# Patient Record
Sex: FEMALE | Race: WHITE | Employment: FULL TIME | URBAN - METROPOLITAN AREA
[De-identification: names, ages, dates, MRNs, and addresses within clinical notes are randomized per-mention and may not be internally consistent; named-entity substitution may affect disease eponyms.]

---

## 2019-01-06 ENCOUNTER — ED HISTORICAL/CONVERTED ENCOUNTER (OUTPATIENT)
Dept: OTHER | Age: 34
End: 2019-01-06

## 2019-01-09 ENCOUNTER — IP HISTORICAL/CONVERTED ENCOUNTER (OUTPATIENT)
Dept: OTHER | Age: 34
End: 2019-01-09

## 2019-08-07 ENCOUNTER — IP HISTORICAL/CONVERTED ENCOUNTER (OUTPATIENT)
Dept: OTHER | Age: 34
End: 2019-08-07

## 2020-02-04 ENCOUNTER — ED HISTORICAL/CONVERTED ENCOUNTER (OUTPATIENT)
Dept: OTHER | Age: 35
End: 2020-02-04

## 2020-03-16 ENCOUNTER — IP HISTORICAL/CONVERTED ENCOUNTER (OUTPATIENT)
Dept: OTHER | Age: 35
End: 2020-03-16

## 2020-11-21 ENCOUNTER — HOSPITAL ENCOUNTER (EMERGENCY)
Age: 35
Discharge: HOME | End: 2020-11-21
Payer: MEDICAID

## 2020-11-21 ENCOUNTER — APPOINTMENT (OUTPATIENT)
Dept: GENERAL RADIOLOGY | Age: 35
End: 2020-11-21
Attending: EMERGENCY MEDICINE
Payer: MEDICAID

## 2020-11-21 VITALS
SYSTOLIC BLOOD PRESSURE: 105 MMHG | DIASTOLIC BLOOD PRESSURE: 67 MMHG | HEART RATE: 98 BPM | WEIGHT: 120 LBS | TEMPERATURE: 99.5 F | HEIGHT: 64 IN | BODY MASS INDEX: 20.49 KG/M2 | RESPIRATION RATE: 16 BRPM | OXYGEN SATURATION: 98 %

## 2020-11-21 DIAGNOSIS — J06.9 UPPER RESPIRATORY TRACT INFECTION, UNSPECIFIED TYPE: Primary | ICD-10-CM

## 2020-11-21 DIAGNOSIS — N39.0 URINARY TRACT INFECTION WITH HEMATURIA, SITE UNSPECIFIED: ICD-10-CM

## 2020-11-21 DIAGNOSIS — R31.9 URINARY TRACT INFECTION WITH HEMATURIA, SITE UNSPECIFIED: ICD-10-CM

## 2020-11-21 LAB
APPEARANCE UR: ABNORMAL
BACTERIA URNS QL MICRO: NEGATIVE /HPF
BILIRUB UR QL: NEGATIVE
COLOR UR: ABNORMAL
FLUAV AG NPH QL IA: NEGATIVE
FLUBV AG NOSE QL IA: NEGATIVE
GLUCOSE UR STRIP.AUTO-MCNC: NEGATIVE MG/DL
HGB UR QL STRIP: NEGATIVE
KETONES UR QL STRIP.AUTO: NEGATIVE MG/DL
LEUKOCYTE ESTERASE UR QL STRIP.AUTO: ABNORMAL
MUCOUS THREADS URNS QL MICRO: ABNORMAL /LPF
NITRITE UR QL STRIP.AUTO: NEGATIVE
PH UR STRIP: >8.5 [PH] (ref 5–8)
PROT UR STRIP-MCNC: >300 MG/DL
RBC #/AREA URNS HPF: ABNORMAL /HPF (ref 0–5)
SARS-COV-2, COV2: NORMAL
SP GR UR REFRACTOMETRY: 1.03 (ref 1–1.03)
UROBILINOGEN UR QL STRIP.AUTO: 4 EU/DL (ref 0.1–1)
WBC URNS QL MICRO: ABNORMAL /HPF (ref 0–4)

## 2020-11-21 PROCEDURE — 99283 EMERGENCY DEPT VISIT LOW MDM: CPT

## 2020-11-21 PROCEDURE — 87804 INFLUENZA ASSAY W/OPTIC: CPT

## 2020-11-21 PROCEDURE — 81001 URINALYSIS AUTO W/SCOPE: CPT

## 2020-11-21 PROCEDURE — 74011250637 HC RX REV CODE- 250/637: Performed by: NURSE PRACTITIONER

## 2020-11-21 PROCEDURE — 87635 SARS-COV-2 COVID-19 AMP PRB: CPT

## 2020-11-21 PROCEDURE — 71045 X-RAY EXAM CHEST 1 VIEW: CPT

## 2020-11-21 RX ORDER — IBUPROFEN 600 MG/1
600 TABLET ORAL
Qty: 20 TAB | Refills: 0 | Status: SHIPPED | OUTPATIENT
Start: 2020-11-21 | End: 2020-12-11

## 2020-11-21 RX ORDER — IBUPROFEN 600 MG/1
600 TABLET ORAL
Status: COMPLETED | OUTPATIENT
Start: 2020-11-21 | End: 2020-11-21

## 2020-11-21 RX ORDER — ONDANSETRON 4 MG/1
4 TABLET, ORALLY DISINTEGRATING ORAL
Qty: 9 TAB | Refills: 0 | Status: SHIPPED | OUTPATIENT
Start: 2020-11-21 | End: 2020-11-24

## 2020-11-21 RX ORDER — CEPHALEXIN 500 MG/1
500 CAPSULE ORAL 4 TIMES DAILY
Qty: 28 CAP | Refills: 0 | Status: SHIPPED | OUTPATIENT
Start: 2020-11-21 | End: 2020-11-28

## 2020-11-21 RX ADMIN — IBUPROFEN 600 MG: 600 TABLET ORAL at 09:02

## 2020-11-21 NOTE — DISCHARGE SUMMARY
Upon reviewing dc instructions. Pt upset as she feels she needs IVF. Pt urine -ketones. Pt skin pink warm dry. Reviewed +urine on urinalysis. Attempted to reassure pt, pt states she will go to another hospital. Advised that was fine. RX sent for keflex and zofran for pt. Pt no active vomiting here in ED. States she does not have urine infection.

## 2020-11-21 NOTE — LETTER
[address] [place] [address] EMERGENCY DEPT 
[address] [place] [address] 
[phone] Work/School Note Date: 11/21/2020 To Whom It May concern: 
 
Travis Carcamo was seen and treated today in the emergency room by the following provider(s): 
Nurse Practitioner: Contreras Qiu NP. Travis Carcamo is excused from work/school on 11/21/20 and 11/22/20. She is medically clear to return to work/school on 11/23/2020.   
 
 
Sincerely, 
 
 
 
 
Lizeth Barton NP

## 2020-11-21 NOTE — ED PROVIDER NOTES
EMERGENCY DEPARTMENT HISTORY AND PHYSICAL EXAM      Date: 11/21/2020  Patient Name: Travis Carcamo    History of Presenting Illness     Chief Complaint   Patient presents with    Generalized Body Aches       History Provided By: Patient    HPI: Travis Carcamo, 30 y.o. female with a past medical history significant No significant past medical history presents to the ED with cc of fever, back pain, lower abd pressure, cough, vomiting and diarrhea. There are no other complaints, changes, or physical findings at this time. PCP: UNKNOWN    No current facility-administered medications on file prior to encounter. No current outpatient medications on file prior to encounter. Past History     Past Medical History:  History reviewed. No pertinent past medical history. Past Surgical History:  History reviewed. No pertinent surgical history. Family History:  History reviewed. No pertinent family history. Social History:  Social History     Tobacco Use    Smoking status: Current Every Day Smoker    Smokeless tobacco: Never Used   Substance Use Topics    Alcohol use: Not on file    Drug use: Not on file       Allergies:  No Known Allergies      Review of Systems     Review of Systems   Constitutional: Positive for fatigue and fever. HENT: Negative. Respiratory: Positive for cough. Negative for shortness of breath. Cardiovascular: Negative. Gastrointestinal: Positive for diarrhea and vomiting. Genitourinary: Positive for dysuria. Negative for decreased urine volume. Dark urine   Musculoskeletal: Negative. Neurological: Positive for headaches. Physical Exam     Physical Exam  Vitals signs and nursing note reviewed. Constitutional:       Appearance: Normal appearance. She is normal weight. HENT:      Head: Normocephalic and atraumatic. Eyes:      Extraocular Movements: Extraocular movements intact. Pupils: Pupils are equal, round, and reactive to light. Cardiovascular:      Rate and Rhythm: Normal rate and regular rhythm. Pulses: Normal pulses. Heart sounds: Normal heart sounds. Pulmonary:      Effort: Pulmonary effort is normal.      Breath sounds: Normal breath sounds. Abdominal:      General: Abdomen is flat. Musculoskeletal: Normal range of motion. Skin:     General: Skin is warm and dry. Neurological:      General: No focal deficit present. Mental Status: She is alert and oriented to person, place, and time. Psychiatric:         Mood and Affect: Mood normal.         Behavior: Behavior normal.         Lab and Diagnostic Study Results     Labs -     Recent Results (from the past 12 hour(s))   URINALYSIS W/MICROSCOPIC    Collection Time: 11/21/20  7:50 AM   Result Value Ref Range    Color Jeniffer      Appearance Turbid (A) Clear      Specific gravity 1.026 1.003 - 1.030      pH (UA) >8.5 (H) 5.0 - 8.0    Protein >300 (A) Negative mg/dL    Glucose Negative Negative mg/dL    Ketone Negative Negative mg/dL    Bilirubin Negative Negative      Blood Negative Negative      Urobilinogen 4.0 (H) 0.1 - 1.0 EU/dL    Nitrites Negative Negative      Leukocyte Esterase Trace (A) Negative      WBC 0-4 0 - 4 /hpf    RBC 0-5 0 - 5 /hpf    Bacteria Negative Negative /hpf    Mucus 4+ /lpf   INFLUENZA A & B AG (RAPID TEST)    Collection Time: 11/21/20  8:00 AM   Result Value Ref Range    Influenza A Antigen Negative Negative      Influenza B Antigen Negative Negative         Radiologic Studies -     CT Results  (Last 48 hours)    None        CXR Results  (Last 48 hours)               11/21/20 0816  XR CHEST SNGL V Final result    Impression:  Impression:   No acute findings. Narrative:  Study: XR CHEST SNGL V       Clinical indication: cough       Comparison: Chest x-ray 5/2/2013. Findings:       No consolidative airspace disease, pleural effusion or pneumothorax. Cardiomediastinal contours are within normal limits.  No pulmonary edema. No acute osseous abnormality identified. Medical Decision Making   - I am the first provider for this patient. - I reviewed the vital signs, available nursing notes, past medical history, past surgical history, family history and social history. - Initial assessment performed. The patients presenting problems have been discussed, and they are in agreement with the care plan formulated and outlined with them. I have encouraged them to ask questions as they arise throughout their visit. Vital Signs-Reviewed the patient's vital signs. Patient Vitals for the past 12 hrs:   Temp Pulse Resp BP SpO2   11/21/20 0747 99.5 °F (37.5 °C) 98 16 105/67 98 %       Records Reviewed: Nursing Notes    The patient presents with back pain with a differential diagnosis of  kidney stone, pneumonia, viral syndrome and uti      ED Course:          Provider Notes (Medical Decision Making):     MDM  Number of Diagnoses or Management Options  Upper respiratory tract infection, unspecified type: new, needed workup  Urinary tract infection with hematuria, site unspecified: new, needed workup     Amount and/or Complexity of Data Reviewed  Clinical lab tests: ordered and reviewed  Tests in the radiology section of CPT®: ordered and reviewed    Risk of Complications, Morbidity, and/or Mortality  Presenting problems: low  Diagnostic procedures: low  Management options: low    Patient Progress  Patient progress: stable         Procedures   Medical Decision Lee Sauceda NP      Disposition   Disposition: DC- Adult Discharges: All of the diagnostic tests were reviewed and questions answered. Diagnosis, care plan and treatment options were discussed. The patient understands the instructions and will follow up as directed. The patients results have been reviewed with them. They have been counseled regarding their diagnosis.   The patient verbally convey understanding and agreement of the signs, symptoms, diagnosis, treatment and prognosis and additionally agrees to follow up as recommended with their PCP in 24 - 48 hours. They also agree with the care-plan and convey that all of their questions have been answered. I have also put together some discharge instructions for them that include: 1) educational information regarding their diagnosis, 2) how to care for their diagnosis at home, as well a 3) list of reasons why they would want to return to the ED prior to their follow-up appointment, should their condition change. Discharged    DISCHARGE PLAN:  1. There are no discharge medications for this patient. 2.   Follow-up Information     Follow up With Specialties Details Why Lorna Davenport MD Family Medicine In 2 days If symptoms worsen [address] [facility] [address]  [phone]          3. Return to ED if worse   4. Current Discharge Medication List      START taking these medications    Details   cephALEXin (Keflex) 500 mg capsule Take 1 Cap by mouth four (4) times daily for 7 days. Qty: 28 Cap, Refills: 0      ibuprofen (MOTRIN) 600 mg tablet Take 1 Tab by mouth every six (6) hours as needed for Pain. Qty: 20 Tab, Refills: 0               Diagnosis     Clinical Impression:   1. Upper respiratory tract infection, unspecified type    2. Urinary tract infection with hematuria, site unspecified        Attestations:    Andrew Miller NP    Please note that this dictation was completed with [employer], the computer voice recognition software. Quite often unanticipated grammatical, syntax, homophones, and other interpretive errors are inadvertently transcribed by the computer software. Please disregard these errors. Please excuse any errors that have escaped final proofreading. Thank you.

## 2020-11-21 NOTE — LETTER
[address] [place] [address] EMERGENCY DEPT 
[facility] [address] [place] [id number] [phone] Work/School Note Date: 11/21/2020 To Whom It May concern: 
 
Yris Kraft was seen and treated today in the emergency room by the following provider(s): 
Nurse Practitioner: Donny Tejeda NP. Yris Kraft is excused from work/school on 11/21/2020 through 11/24/2020. She is medically clear to return to work/school on 11/25/2020. Sincerely, Rina Alicia

## 2020-11-23 LAB — SARS-COV-2, COV2NT: NOT DETECTED

## 2020-11-23 NOTE — PROGRESS NOTES
Patient called and informed of NEGATIVE COVID-19 results over the phone. Printed results and left in envelope at ED  for patient .   DARNELL Moore

## 2020-12-11 ENCOUNTER — HOSPITAL ENCOUNTER (OUTPATIENT)
Age: 35
Setting detail: OBSERVATION
Discharge: HOME | End: 2020-12-12
Attending: EMERGENCY MEDICINE | Admitting: HOSPITALIST
Payer: MEDICAID

## 2020-12-11 ENCOUNTER — APPOINTMENT (OUTPATIENT)
Dept: CT IMAGING | Age: 35
End: 2020-12-11
Attending: EMERGENCY MEDICINE
Payer: MEDICAID

## 2020-12-11 DIAGNOSIS — F10.10 ALCOHOL ABUSE: Primary | ICD-10-CM

## 2020-12-11 DIAGNOSIS — E86.0 DEHYDRATION: ICD-10-CM

## 2020-12-11 DIAGNOSIS — E87.6 HYPOKALEMIA: ICD-10-CM

## 2020-12-11 PROBLEM — K29.70 GASTRITIS: Status: ACTIVE | Noted: 2020-12-11

## 2020-12-11 LAB
ALBUMIN SERPL-MCNC: 3.9 G/DL (ref 3.5–5)
ALBUMIN/GLOB SERPL: 1 {RATIO} (ref 1.1–2.2)
ALP SERPL-CCNC: 126 U/L (ref 45–117)
ALT SERPL-CCNC: 69 U/L (ref 12–78)
ANION GAP SERPL CALC-SCNC: 11 MMOL/L (ref 5–15)
ANION GAP SERPL CALC-SCNC: 4 MMOL/L (ref 5–15)
APPEARANCE UR: CLEAR
AST SERPL W P-5'-P-CCNC: 56 U/L (ref 15–37)
BACTERIA URNS QL MICRO: NEGATIVE /HPF
BASOPHILS # BLD: 0 K/UL (ref 0–0.1)
BASOPHILS NFR BLD: 0 % (ref 0–1)
BILIRUB SERPL-MCNC: 0.6 MG/DL (ref 0.2–1)
BILIRUB UR QL: NEGATIVE
BUN SERPL-MCNC: 5 MG/DL (ref 6–20)
BUN SERPL-MCNC: 6 MG/DL (ref 6–20)
BUN/CREAT SERPL: 7 (ref 12–20)
BUN/CREAT SERPL: 7 (ref 12–20)
CA-I BLD-MCNC: 8.9 MG/DL (ref 8.5–10.1)
CA-I BLD-MCNC: 9.1 MG/DL (ref 8.5–10.1)
CHLORIDE SERPL-SCNC: 92 MMOL/L (ref 97–108)
CHLORIDE SERPL-SCNC: 96 MMOL/L (ref 97–108)
CO2 SERPL-SCNC: 32 MMOL/L (ref 21–32)
CO2 SERPL-SCNC: 35 MMOL/L (ref 21–32)
COLOR UR: NORMAL
CREAT SERPL-MCNC: 0.71 MG/DL (ref 0.55–1.02)
CREAT SERPL-MCNC: 0.82 MG/DL (ref 0.55–1.02)
DIFFERENTIAL METHOD BLD: ABNORMAL
EOSINOPHIL # BLD: 0 K/UL (ref 0–0.4)
EOSINOPHIL NFR BLD: 0 % (ref 0–7)
ERYTHROCYTE [DISTWIDTH] IN BLOOD BY AUTOMATED COUNT: 12.6 % (ref 11.5–14.5)
ETHANOL SERPL-MCNC: 55 MG/DL
GLOBULIN SER CALC-MCNC: 3.9 G/DL (ref 2–4)
GLUCOSE SERPL-MCNC: 123 MG/DL (ref 65–100)
GLUCOSE SERPL-MCNC: 98 MG/DL (ref 65–100)
GLUCOSE UR STRIP.AUTO-MCNC: NEGATIVE MG/DL
HCG SERPL QL: NEGATIVE
HCT VFR BLD AUTO: 41.3 % (ref 35–47)
HGB BLD-MCNC: 14.4 G/DL (ref 11.5–16)
HGB UR QL STRIP: NEGATIVE
IMM GRANULOCYTES # BLD AUTO: 0 K/UL (ref 0–0.04)
IMM GRANULOCYTES NFR BLD AUTO: 0 % (ref 0–0.5)
KETONES UR QL STRIP.AUTO: NEGATIVE MG/DL
LEUKOCYTE ESTERASE UR QL STRIP.AUTO: NEGATIVE
LIPASE SERPL-CCNC: 18 U/L (ref 73–393)
LYMPHOCYTES # BLD: 1.9 K/UL (ref 0.8–3.5)
LYMPHOCYTES NFR BLD: 21 % (ref 12–49)
MAGNESIUM SERPL-MCNC: 2.1 MG/DL (ref 1.6–2.4)
MCH RBC QN AUTO: 34 PG (ref 26–34)
MCHC RBC AUTO-ENTMCNC: 34.9 G/DL (ref 30–36.5)
MCV RBC AUTO: 97.6 FL (ref 80–99)
MONOCYTES # BLD: 1 K/UL (ref 0–1)
MONOCYTES NFR BLD: 10 % (ref 5–13)
MUCOUS THREADS URNS QL MICRO: NORMAL /LPF
NEUTS SEG # BLD: 6.3 K/UL (ref 1.8–8)
NEUTS SEG NFR BLD: 69 % (ref 32–75)
NITRITE UR QL STRIP.AUTO: NEGATIVE
NRBC # BLD: 0 K/UL (ref 0–0.01)
NRBC BLD-RTO: 0 PER 100 WBC
PH UR STRIP: 6 [PH] (ref 5–8)
PLATELET # BLD AUTO: 495 K/UL (ref 150–400)
PMV BLD AUTO: 9.5 FL (ref 8.9–12.9)
POTASSIUM SERPL-SCNC: 2.5 MMOL/L (ref 3.5–5.1)
POTASSIUM SERPL-SCNC: 4.4 MMOL/L (ref 3.5–5.1)
PROT SERPL-MCNC: 7.8 G/DL (ref 6.4–8.2)
PROT UR STRIP-MCNC: NEGATIVE MG/DL
RBC # BLD AUTO: 4.23 M/UL (ref 3.8–5.2)
RBC #/AREA URNS HPF: NORMAL /HPF (ref 0–5)
SODIUM SERPL-SCNC: 135 MMOL/L (ref 136–145)
SODIUM SERPL-SCNC: 135 MMOL/L (ref 136–145)
SP GR UR REFRACTOMETRY: 1.01 (ref 1–1.03)
UA: UC IF INDICATED,UAUC: NORMAL
UROBILINOGEN UR QL STRIP.AUTO: 0.1 EU/DL (ref 0.1–1)
WBC # BLD AUTO: 9.2 K/UL (ref 3.6–11)
WBC URNS QL MICRO: NORMAL /HPF (ref 0–4)

## 2020-12-11 PROCEDURE — 81001 URINALYSIS AUTO W/SCOPE: CPT

## 2020-12-11 PROCEDURE — 80048 BASIC METABOLIC PNL TOTAL CA: CPT

## 2020-12-11 PROCEDURE — 74011250637 HC RX REV CODE- 250/637: Performed by: HOSPITALIST

## 2020-12-11 PROCEDURE — 80053 COMPREHEN METABOLIC PANEL: CPT

## 2020-12-11 PROCEDURE — 85025 COMPLETE CBC W/AUTO DIFF WBC: CPT

## 2020-12-11 PROCEDURE — 84703 CHORIONIC GONADOTROPIN ASSAY: CPT

## 2020-12-11 PROCEDURE — 99218 HC RM OBSERVATION: CPT

## 2020-12-11 PROCEDURE — 74177 CT ABD & PELVIS W/CONTRAST: CPT

## 2020-12-11 PROCEDURE — 80307 DRUG TEST PRSMV CHEM ANLYZR: CPT

## 2020-12-11 PROCEDURE — 96376 TX/PRO/DX INJ SAME DRUG ADON: CPT

## 2020-12-11 PROCEDURE — 83735 ASSAY OF MAGNESIUM: CPT

## 2020-12-11 PROCEDURE — 74011250636 HC RX REV CODE- 250/636: Performed by: FAMILY MEDICINE

## 2020-12-11 PROCEDURE — 74011250637 HC RX REV CODE- 250/637: Performed by: EMERGENCY MEDICINE

## 2020-12-11 PROCEDURE — 96361 HYDRATE IV INFUSION ADD-ON: CPT

## 2020-12-11 PROCEDURE — 74011250636 HC RX REV CODE- 250/636: Performed by: EMERGENCY MEDICINE

## 2020-12-11 PROCEDURE — 96365 THER/PROPH/DIAG IV INF INIT: CPT

## 2020-12-11 PROCEDURE — 99284 EMERGENCY DEPT VISIT MOD MDM: CPT

## 2020-12-11 PROCEDURE — 74011250636 HC RX REV CODE- 250/636: Performed by: HOSPITALIST

## 2020-12-11 PROCEDURE — 74011000636 HC RX REV CODE- 636: Performed by: HOSPITALIST

## 2020-12-11 PROCEDURE — 74011000250 HC RX REV CODE- 250: Performed by: EMERGENCY MEDICINE

## 2020-12-11 PROCEDURE — 83690 ASSAY OF LIPASE: CPT

## 2020-12-11 PROCEDURE — 96375 TX/PRO/DX INJ NEW DRUG ADDON: CPT

## 2020-12-11 PROCEDURE — 96366 THER/PROPH/DIAG IV INF ADDON: CPT

## 2020-12-11 PROCEDURE — 36415 COLL VENOUS BLD VENIPUNCTURE: CPT

## 2020-12-11 RX ORDER — DIAZEPAM 5 MG/1
5 TABLET ORAL
Status: DISCONTINUED | OUTPATIENT
Start: 2020-12-11 | End: 2020-12-12 | Stop reason: HOSPADM

## 2020-12-11 RX ORDER — ONDANSETRON 2 MG/ML
4 INJECTION INTRAMUSCULAR; INTRAVENOUS
Status: DISCONTINUED | OUTPATIENT
Start: 2020-12-11 | End: 2020-12-12 | Stop reason: HOSPADM

## 2020-12-11 RX ORDER — MORPHINE SULFATE 4 MG/ML
4 INJECTION INTRAVENOUS ONCE
Status: COMPLETED | OUTPATIENT
Start: 2020-12-11 | End: 2020-12-11

## 2020-12-11 RX ORDER — SODIUM CHLORIDE 0.9 % (FLUSH) 0.9 %
5-40 SYRINGE (ML) INJECTION AS NEEDED
Status: DISCONTINUED | OUTPATIENT
Start: 2020-12-11 | End: 2020-12-12 | Stop reason: HOSPADM

## 2020-12-11 RX ORDER — POTASSIUM CHLORIDE 1.5 G/1.77G
60 POWDER, FOR SOLUTION ORAL 2 TIMES DAILY WITH MEALS
Status: DISCONTINUED | OUTPATIENT
Start: 2020-12-11 | End: 2020-12-12 | Stop reason: HOSPADM

## 2020-12-11 RX ORDER — ACETAMINOPHEN 325 MG/1
650 TABLET ORAL
Status: DISCONTINUED | OUTPATIENT
Start: 2020-12-11 | End: 2020-12-12 | Stop reason: HOSPADM

## 2020-12-11 RX ORDER — ONDANSETRON 2 MG/ML
4 INJECTION INTRAMUSCULAR; INTRAVENOUS
Status: COMPLETED | OUTPATIENT
Start: 2020-12-11 | End: 2020-12-11

## 2020-12-11 RX ORDER — LORAZEPAM 2 MG/ML
1 INJECTION INTRAMUSCULAR
Status: DISCONTINUED | OUTPATIENT
Start: 2020-12-11 | End: 2020-12-12 | Stop reason: HOSPADM

## 2020-12-11 RX ORDER — MAG HYDROX/ALUMINUM HYD/SIMETH 200-200-20
30 SUSPENSION, ORAL (FINAL DOSE FORM) ORAL ONCE
Status: COMPLETED | OUTPATIENT
Start: 2020-12-11 | End: 2020-12-11

## 2020-12-11 RX ORDER — DIAZEPAM 5 MG/1
20 TABLET ORAL
Status: DISCONTINUED | OUTPATIENT
Start: 2020-12-11 | End: 2020-12-11

## 2020-12-11 RX ORDER — POTASSIUM CHLORIDE AND SODIUM CHLORIDE 900; 300 MG/100ML; MG/100ML
INJECTION, SOLUTION INTRAVENOUS CONTINUOUS
Status: DISCONTINUED | OUTPATIENT
Start: 2020-12-11 | End: 2020-12-12 | Stop reason: HOSPADM

## 2020-12-11 RX ORDER — FAMOTIDINE 20 MG/1
20 TABLET, FILM COATED ORAL 2 TIMES DAILY
Status: DISCONTINUED | OUTPATIENT
Start: 2020-12-11 | End: 2020-12-12 | Stop reason: HOSPADM

## 2020-12-11 RX ORDER — LORAZEPAM 2 MG/ML
2 INJECTION INTRAMUSCULAR
Status: DISCONTINUED | OUTPATIENT
Start: 2020-12-11 | End: 2020-12-12 | Stop reason: HOSPADM

## 2020-12-11 RX ORDER — SODIUM CHLORIDE 0.9 % (FLUSH) 0.9 %
5-40 SYRINGE (ML) INJECTION EVERY 8 HOURS
Status: DISCONTINUED | OUTPATIENT
Start: 2020-12-11 | End: 2020-12-12 | Stop reason: HOSPADM

## 2020-12-11 RX ORDER — LIDOCAINE HYDROCHLORIDE 20 MG/ML
15 SOLUTION OROPHARYNGEAL ONCE
Status: COMPLETED | OUTPATIENT
Start: 2020-12-11 | End: 2020-12-11

## 2020-12-11 RX ORDER — POLYETHYLENE GLYCOL 3350 17 G/17G
17 POWDER, FOR SOLUTION ORAL DAILY PRN
Status: DISCONTINUED | OUTPATIENT
Start: 2020-12-11 | End: 2020-12-12 | Stop reason: HOSPADM

## 2020-12-11 RX ORDER — PROMETHAZINE HYDROCHLORIDE 25 MG/1
12.5 TABLET ORAL
Status: DISCONTINUED | OUTPATIENT
Start: 2020-12-11 | End: 2020-12-12 | Stop reason: HOSPADM

## 2020-12-11 RX ORDER — MORPHINE SULFATE 2 MG/ML
1 INJECTION, SOLUTION INTRAMUSCULAR; INTRAVENOUS
Status: COMPLETED | OUTPATIENT
Start: 2020-12-11 | End: 2020-12-12

## 2020-12-11 RX ORDER — MAG HYDROX/ALUMINUM HYD/SIMETH 200-200-20
30 SUSPENSION, ORAL (FINAL DOSE FORM) ORAL ONCE
Status: DISPENSED | OUTPATIENT
Start: 2020-12-11 | End: 2020-12-11

## 2020-12-11 RX ORDER — DIAZEPAM 5 MG/1
10 TABLET ORAL
Status: DISCONTINUED | OUTPATIENT
Start: 2020-12-11 | End: 2020-12-12 | Stop reason: HOSPADM

## 2020-12-11 RX ORDER — MAGNESIUM SULFATE 1 G/100ML
1 INJECTION INTRAVENOUS ONCE
Status: COMPLETED | OUTPATIENT
Start: 2020-12-11 | End: 2020-12-11

## 2020-12-11 RX ORDER — ACETAMINOPHEN 650 MG/1
650 SUPPOSITORY RECTAL
Status: DISCONTINUED | OUTPATIENT
Start: 2020-12-11 | End: 2020-12-12 | Stop reason: HOSPADM

## 2020-12-11 RX ORDER — DIAZEPAM 5 MG/1
10 TABLET ORAL
Status: DISCONTINUED | OUTPATIENT
Start: 2020-12-11 | End: 2020-12-11

## 2020-12-11 RX ADMIN — POTASSIUM CHLORIDE AND SODIUM CHLORIDE: 900; 300 INJECTION, SOLUTION INTRAVENOUS at 18:42

## 2020-12-11 RX ADMIN — FAMOTIDINE 20 MG: 20 TABLET ORAL at 15:27

## 2020-12-11 RX ADMIN — DIAZEPAM 10 MG: 5 TABLET ORAL at 18:50

## 2020-12-11 RX ADMIN — ONDANSETRON 4 MG: 2 INJECTION INTRAMUSCULAR; INTRAVENOUS at 13:41

## 2020-12-11 RX ADMIN — ONDANSETRON 4 MG: 2 INJECTION INTRAMUSCULAR; INTRAVENOUS at 08:18

## 2020-12-11 RX ADMIN — ONDANSETRON 4 MG: 2 INJECTION INTRAMUSCULAR; INTRAVENOUS at 11:20

## 2020-12-11 RX ADMIN — LIDOCAINE HYDROCHLORIDE 15 ML: 20 SOLUTION ORAL; TOPICAL at 08:18

## 2020-12-11 RX ADMIN — MORPHINE SULFATE 1 MG: 2 INJECTION, SOLUTION INTRAMUSCULAR; INTRAVENOUS at 19:57

## 2020-12-11 RX ADMIN — POTASSIUM CHLORIDE 60 MEQ: 1.5 FOR SOLUTION ORAL at 18:42

## 2020-12-11 RX ADMIN — FAMOTIDINE 20 MG: 20 TABLET ORAL at 19:57

## 2020-12-11 RX ADMIN — MORPHINE SULFATE 4 MG: 4 INJECTION INTRAVENOUS at 13:41

## 2020-12-11 RX ADMIN — POTASSIUM CHLORIDE AND SODIUM CHLORIDE: 900; 300 INJECTION, SOLUTION INTRAVENOUS at 11:15

## 2020-12-11 RX ADMIN — SODIUM CHLORIDE 1000 ML: 9 INJECTION, SOLUTION INTRAVENOUS at 08:15

## 2020-12-11 RX ADMIN — Medication 10 ML: at 14:00

## 2020-12-11 RX ADMIN — IOPAMIDOL 100 ML: 755 INJECTION, SOLUTION INTRAVENOUS at 13:23

## 2020-12-11 RX ADMIN — POTASSIUM CHLORIDE 60 MEQ: 1.5 FOR SOLUTION ORAL at 11:13

## 2020-12-11 RX ADMIN — MAGNESIUM SULFATE HEPTAHYDRATE 1 G: 1 INJECTION, SOLUTION INTRAVENOUS at 15:27

## 2020-12-11 RX ADMIN — ALUMINUM HYDROXIDE, MAGNESIUM HYDROXIDE, AND SIMETHICONE 30 ML: 200; 200; 20 SUSPENSION ORAL at 08:19

## 2020-12-11 NOTE — ED NOTES
SBIRT CONSULT    Pt was given a universal screening for alcohol, drugs and depression. She scored a 35 on the AUDIT placing her at a high risk level for negative health affects. Pt was receptive in discussing all of the negative affects and exploring options to reduce her intake. Ultimately, we decided that detox would be best. She was provided with all of the resources needed for detox. Disposition discussed with the nurse.

## 2020-12-11 NOTE — H&P
History and Physical    Patient: Neri Malik MRN: [id number]  SSN: [id number]    YOB: 1985  Age: 30 y.o. Sex: female      Subjective:      Chief Complaint: nausea and vomiting, alcohol abuse    HPI: Neri Malik is a 30 y.o. female who has significant history of regular alcohol abuse came to the ER with a complaint of nausea vomiting since last 2 or 3 days. Patient continues to drink during this time as well. She states that she was unable to keep anything down so came to ER. On evaluation in ER patient was found to be dehydrated and hypokalemic. Hospitalist service was requested to admit the patient for further work-up and management. Past Medical History:   Diagnosis Date    Ill-defined condition     ETOH Abuse     History reviewed. No pertinent surgical history. History reviewed. No pertinent family history.   Social History     Tobacco Use    Smoking status: Current Every Day Smoker     Packs/day: 0.50    Smokeless tobacco: Never Used   Substance Use Topics    Alcohol use: Yes     Comment: Daily      Prior to Admission medications    Not on File        No Known Allergies    Review of Systems:  Constitutional: No fevers, No chills, No fatigue, No weakness  Eyes: No visual disturbance  Ears, Nose, Mouth, Throat, and Face: No nasal congestion, No sore throat  Respiratory: No cough, No sputum, No wheezing, No SOB  Cardiovascular: No chest pain, No lower extremity edema, No Palpitations   Gastrointestinal: + nausea, + vomiting, No diarrhea, No constipation, + abdominal pain  Genitourinary: No frequency, No dysuria, No hematuria  Integument/Breast: No rash, No skin lesion(s), No dryness  Musculoskeletal: No arthralgias, No neck pain, No back pain  Neurological: No headaches, No dizziness, No confusion,  No seizures  Behavioral/Psychiatric: No anxiety, No depression      Objective:     Vitals:    12/11/20 0705   BP: 104/61   Pulse: (!) 117   Resp: 16   Temp: 98.4 °F (36.9 °C)   SpO2: 98%   Weight: 54.4 kg (120 lb)   Height: 5' 4\" (1.626 m)        Physical Exam:  General: Alert and Oriented x 3. Cooperative and friendly. No acute distress. HEAD: Normocephalic, atraumatic. Eye: PERRLA, EOMI. Throat and Neck: normal and no erythema or exudates noted. No mass   Lung: Clear to auscultation bilaterally without wheezes, rhonchi. Good air movement bilaterally. Heart: Regular rate and rhythm. Normal S1/S2. NO appreciated murmurs, rubs or gallops. Abdomen: soft, non-tender. Bowel sounds present in all four quadrants. No masses appreciated. Extremities:  able to move all extremities normal, atraumatic  Skin: Clean, dry and intact without appreciated lesions. Neurologic: AOx3. Cranial nerves 2-12 and sensation grossly intact. Psychiatric: non focal, normal affect, normal thought process    CBC:   Lab Results   Component Value Date/Time    WBC 9.2 12/11/2020 07:56 AM    RBC 4.23 12/11/2020 07:56 AM    HGB 14.4 12/11/2020 07:56 AM    HCT 41.3 12/11/2020 07:56 AM    PLATELET 361 (H) 92/38/4605 07:56 AM     CMP:   Lab Results   Component Value Date/Time    Glucose 123 (H) 12/11/2020 07:56 AM    Sodium 135 (L) 12/11/2020 07:56 AM    Potassium 2.5 (LL) 12/11/2020 07:56 AM    Chloride 92 (L) 12/11/2020 07:56 AM    CO2 32 12/11/2020 07:56 AM    BUN 5 (L) 12/11/2020 07:56 AM    Creatinine 0.71 12/11/2020 07:56 AM    Calcium 8.9 12/11/2020 07:56 AM    Anion gap 11 12/11/2020 07:56 AM    BUN/Creatinine ratio 7 (L) 12/11/2020 07:56 AM    Alk.  phosphatase 126 (H) 12/11/2020 07:56 AM    Protein, total 7.8 12/11/2020 07:56 AM    Albumin 3.9 12/11/2020 07:56 AM    Globulin 3.9 12/11/2020 07:56 AM    A-G Ratio 1.0 (L) 12/11/2020 07:56 AM     Radiology review:   CT ABD PELV W CONT    (Results Pending)       Assessment:     Active Problems:    Hypokalemia (12/11/2020)      Alcohol abuse (12/11/2020)      Gastritis (12/11/2020)         Plan:     67-year-old female came to the hospital with a complaint of abdominal pain alcohol abuse  1. Abdominal pain likely due to multiple episodes of vomiting: Patient also have gastritis. We will give Pepcid for gastritis. Patient will also be given Mylanta. Will try a clear liquid diet. CT scan of abdominal was ordered in ER we will follow-up with the results. Patient's lipase level is normal.  2.  Alcohol abuse: We will monitor for withdrawal symptoms. Start the patient on banana bag. Will use benzodiazepine for any withdrawals  Patient was educated to quit drinking  3. Hypokalemia: P.o. potassium supplements were given in ER. Patient has also been getting IV potassium supplements. We will check patient's repeat potassium level this evening. We will also check patient's magnesium level.   Further plan of management depend upon patient's clinical course in the hospital.    Signed By: Deepa Izquierdo MD     December 11, 2020

## 2020-12-11 NOTE — PROGRESS NOTES
Admission skin assessment by Billy Hartley RN and Taylor Rainey RN. Skin is warm, dry, and intact. No evidence of skin breakdown at this time.

## 2020-12-11 NOTE — ED PROVIDER NOTES
EMERGENCY DEPARTMENT HISTORY AND PHYSICAL EXAM      Date: 12/11/2020  Patient Name: Satnam Trinidad    History of Presenting Illness     Chief Complaint   Patient presents with    Abdominal Pain       History Provided By: Patient    HPI: Satnam Trinidad, 30 y.o. female with a past medical history significant etoh abuse presents to the ED with chief complaint of Abdominal Pain  . Patient has been feeling ill for the last 3 to 4 days. Crampy periumbilical abdominal pain also with some low back pain. No diarrhea. No blood in the vomit. Pain does radiate to the right side. She is status post cholecystectomy. No fevers that she knows of no coughing. No pain medicine prior to arrival she cannot keep anything down. There are no other complaints, changes, or physical findings at this time. PCP: UNKNOWN    Current Facility-Administered Medications   Medication Dose Route Frequency Provider Last Rate Last Dose    aluminum & magnesium hydroxide-simethicone (MYLANTA II) oral suspension 30 mL  30 mL Oral ONCE Frankie LAMB MD   Stopped at 12/11/20 0735    0.9% sodium chloride with KCl 40 mEq/L infusion   IntraVENous CONTINUOUS Anastasia Hubbard MD        potassium chloride (KLOR-CON) packet for solution 60 mEq  60 mEq Oral BID WITH MEALS Anastasia Hubbard MD           Past History     Past Medical History:  Past Medical History:   Diagnosis Date    Ill-defined condition     ETOH Abuse       Past Surgical History:  choleCystectomy    Family History:  History reviewed. No pertinent family history. Social History:  Social History     Tobacco Use    Smoking status: Current Every Day Smoker     Packs/day: 0.50    Smokeless tobacco: Never Used   Substance Use Topics    Alcohol use: Yes     Comment: Daily    Drug use: Never       Allergies:  No Known Allergies      Review of Systems   Review of Systems   Constitutional: Negative. Negative for chills, fatigue and fever. HENT: Negative.   Negative for congestion, nosebleeds and sore throat. Eyes: Negative. Negative for pain, discharge and visual disturbance. Respiratory: Negative. Negative for cough, chest tightness and shortness of breath. Cardiovascular: Negative for chest pain, palpitations and leg swelling. Gastrointestinal: Positive for abdominal pain, nausea and vomiting. Negative for blood in stool, constipation and diarrhea. Endocrine: Negative. Genitourinary: Negative. Negative for difficulty urinating, dysuria, pelvic pain and vaginal bleeding. Musculoskeletal: Negative. Negative for arthralgias, back pain and myalgias. Skin: Negative. Negative for rash and wound. Allergic/Immunologic: Negative. Neurological: Negative. Negative for dizziness, syncope, weakness, numbness and headaches. Hematological: Negative. Psychiatric/Behavioral: Negative. Negative for agitation, confusion and suicidal ideas. All other systems reviewed and are negative. Physical Exam   Physical Exam  Vitals signs and nursing note reviewed. Exam conducted with a chaperone present. Constitutional:       General: She is in acute distress. Appearance: Normal appearance. She is normal weight. She is ill-appearing. HENT:      Head: Normocephalic and atraumatic. Nose: Nose normal.      Mouth/Throat:      Mouth: Mucous membranes are moist.      Pharynx: Oropharynx is clear. Eyes:      Extraocular Movements: Extraocular movements intact. Conjunctiva/sclera: Conjunctivae normal.      Pupils: Pupils are equal, round, and reactive to light. Neck:      Musculoskeletal: Normal range of motion and neck supple. Cardiovascular:      Rate and Rhythm: Regular rhythm. Tachycardia present. Pulses: Normal pulses. Heart sounds: Normal heart sounds. Pulmonary:      Effort: Pulmonary effort is normal. No respiratory distress. Breath sounds: Normal breath sounds. Abdominal:      General: Abdomen is flat.  Bowel sounds are normal. There is no distension. Palpations: Abdomen is soft. Tenderness: There is generalized abdominal tenderness. There is no guarding. Musculoskeletal: Normal range of motion. General: No swelling, tenderness, deformity or signs of injury. Right lower leg: No edema. Left lower leg: No edema. Skin:     General: Skin is warm and dry. Capillary Refill: Capillary refill takes less than 2 seconds. Findings: No lesion or rash. Neurological:      General: No focal deficit present. Mental Status: She is alert and oriented to person, place, and time. Mental status is at baseline. Cranial Nerves: No cranial nerve deficit. Psychiatric:         Mood and Affect: Mood normal.         Behavior: Behavior normal.         Thought Content: Thought content normal.         Judgment: Judgment normal.         Diagnostic Study Results     Labs -     Recent Results (from the past 12 hour(s))   CBC WITH AUTOMATED DIFF    Collection Time: 12/11/20  7:56 AM   Result Value Ref Range    WBC 9.2 3.6 - 11.0 K/uL    RBC 4.23 3.80 - 5.20 M/uL    HGB 14.4 11.5 - 16.0 g/dL    HCT 41.3 35.0 - 47.0 %    MCV 97.6 80.0 - 99.0 FL    MCH 34.0 26.0 - 34.0 PG    MCHC 34.9 30.0 - 36.5 g/dL    RDW 12.6 11.5 - 14.5 %    PLATELET 327 (H) 869 - 400 K/uL    MPV 9.5 8.9 - 12.9 FL    NRBC 0.0 0  WBC    ABSOLUTE NRBC 0.00 0.00 - 0.01 K/uL    NEUTROPHILS 69 32 - 75 %    LYMPHOCYTES 21 12 - 49 %    MONOCYTES 10 5 - 13 %    EOSINOPHILS 0 0 - 7 %    BASOPHILS 0 0 - 1 %    IMMATURE GRANULOCYTES 0 0.0 - 0.5 %    ABS. NEUTROPHILS 6.3 1.8 - 8.0 K/UL    ABS. LYMPHOCYTES 1.9 0.8 - 3.5 K/UL    ABS. MONOCYTES 1.0 0.0 - 1.0 K/UL    ABS. EOSINOPHILS 0.0 0.0 - 0.4 K/UL    ABS. BASOPHILS 0.0 0.0 - 0.1 K/UL    ABS. IMM.  GRANS. 0.0 0.00 - 0.04 K/UL    DF AUTOMATED     METABOLIC PANEL, COMPREHENSIVE    Collection Time: 12/11/20  7:56 AM   Result Value Ref Range    Sodium 135 (L) 136 - 145 mmol/L    Potassium 2.5 (LL) 3.5 - 5.1 mmol/L    Chloride 92 (L) 97 - 108 mmol/L    CO2 32 21 - 32 mmol/L    Anion gap 11 5 - 15 mmol/L    Glucose 123 (H) 65 - 100 mg/dL    BUN 5 (L) 6 - 20 mg/dL    Creatinine 0.71 0.55 - 1.02 mg/dL    BUN/Creatinine ratio 7 (L) 12 - 20      GFR est AA >60 >60 ml/min/1.73m2    GFR est non-AA >60 >60 ml/min/1.73m2    Calcium 8.9 8.5 - 10.1 mg/dL    Bilirubin, total 0.6 0.2 - 1.0 mg/dL    AST (SGOT) 56 (H) 15 - 37 U/L    ALT (SGPT) 69 12 - 78 U/L    Alk. phosphatase 126 (H) 45 - 117 U/L    Protein, total 7.8 6.4 - 8.2 g/dL    Albumin 3.9 3.5 - 5.0 g/dL    Globulin 3.9 2.0 - 4.0 g/dL    A-G Ratio 1.0 (L) 1.1 - 2.2     LIPASE    Collection Time: 12/11/20  7:56 AM   Result Value Ref Range    Lipase 18 (L) 73 - 393 U/L   HCG QL SERUM    Collection Time: 12/11/20  7:56 AM   Result Value Ref Range    HCG, Ql. Negative Negative     ETHYL ALCOHOL    Collection Time: 12/11/20  7:56 AM   Result Value Ref Range    ALCOHOL(ETHYL),SERUM 55 (H) <10 mg/dL   URINALYSIS W/ REFLEX CULTURE    Collection Time: 12/11/20  9:52 AM    Specimen: Urine   Result Value Ref Range    Color Yellow/Straw      Appearance Clear Clear      Specific gravity 1.015 1.003 - 1.030      pH (UA) 6.0 5.0 - 8.0      Protein Negative Negative mg/dL    Glucose Negative Negative mg/dL    Ketone Negative Negative mg/dL    Bilirubin Negative Negative      Blood Negative Negative      Urobilinogen 0.1 0.1 - 1.0 EU/dL    Nitrites Negative Negative      Leukocyte Esterase Negative Negative      UA:UC IF INDICATED Culture not indicated by UA result Culture not indicated by UA result      WBC 0-4 0 - 4 /hpf    RBC 0-5 0 - 5 /hpf    Bacteria Negative Negative /hpf    Mucus Trace /lpf       Radiologic Studies -   No orders to display     CT Results  (Last 48 hours)    None        CXR Results  (Last 48 hours)    None          Medical Decision Making and ED Course   I am the first provider for this patient.     I reviewed the vital signs, available nursing notes, past medical history, past surgical history, family history and social history. Vital Signs-Reviewed the patient's vital signs. Patient Vitals for the past 12 hrs:   Temp Pulse Resp BP SpO2   12/11/20 0705 98.4 °F (36.9 °C) (!) 117 16 104/61 98 %       EKG interpretation:         Records Reviewed: Previous Hospital chart. EMS run report      ED Course:   Initial assessment performed. The patients presenting problems have been discussed, and they are in agreement with the care plan formulated and outlined with them. I have encouraged them to ask questions as they arise throughout their visit. Orders Placed This Encounter    CBC WITH AUTOMATED DIFF     Standing Status:   Standing     Number of Occurrences:   1    METABOLIC PANEL, COMPREHENSIVE     Standing Status:   Standing     Number of Occurrences:   1    LIPASE     Standing Status:   Standing     Number of Occurrences:   1    HCG QL SERUM     Standing Status:   Standing     Number of Occurrences:   1    URINALYSIS W/ REFLEX CULTURE     Standing Status:   Standing     Number of Occurrences:   1    BLOOD ALCOHOL (Ethyl Alcohol)     Standing Status:   Standing     Number of Occurrences:   1    MAGNESIUM     Standing Status:   Standing     Number of Occurrences:   1    NOTIFY PROVIDER: SPECIFY Notify provider on pt's arrival to floor ONE TIME STAT     Standing Status:   Standing     Number of Occurrences:   1     Order Specific Question:   Please describe the test or procedure you would like to order.      Answer:   Notify provider on pt's arrival to floor    Droplet Plus     Standing Status:   Standing     Number of Occurrences:   1    EKG 12 LEAD INITIAL     Standing Status:   Standing     Number of Occurrences:   1     Order Specific Question:   Reason for Exam:     Answer:   electyloye    sodium chloride 0.9 % bolus infusion 1,000 mL    ondansetron (ZOFRAN) injection 4 mg    aluminum & magnesium hydroxide-simethicone (MYLANTA II) oral suspension 30 mL    lidocaine (XYLOCAINE) 2 % viscous solution 15 mL    alum-mag hydroxide-simeth (MYLANTA) oral suspension 30 mL    0.9% sodium chloride with KCl 40 mEq/L infusion    potassium chloride (KLOR-CON) packet for solution 60 mEq    IP CONSULT TO HOSPITALIST     Standing Status:   Standing     Number of Occurrences:   1     Order Specific Question:   Reason for Consult: Answer:   TO ADMIT     Order Specific Question:   Did you call or speak to the consulting provider? Answer: Yes              CONSULTANTS:  Consults  fahad admit 11:01 AM      Provider Notes (Medical Decision Making):   30-year-old female with a history of heavy alcohol abuse unable to keep any orals down presents with clinical dehydration. Differential includes MARAH, dehydration, UTI, gastroenteritis. She has significant hypokalemia with inability to tolerate p.o.'s.  Plan for admission for hydration. Procedures                       Disposition       Emergency Department Disposition:  admit      Diagnosis     Clinical Impression:   1. Dehydration    2. Hypokalemia    3. Alcohol abuse        Attestations:    Amador Sepulveda MD    Please note that this dictation was completed with [email or web address], the computer voice recognition software. Quite often unanticipated grammatical, syntax, homophones, and other interpretive errors are inadvertently transcribed by the computer software. Please disregard these errors. Please excuse any errors that have escaped final proofreading. Thank you.

## 2020-12-11 NOTE — ED TRIAGE NOTES
Reports heavy ETOH use \"for years\". Now reports RUQ abd pain radiates to back with N/V x4 days. Patient states \" I cant eat or drink, all I can do is drink\".

## 2020-12-12 VITALS
DIASTOLIC BLOOD PRESSURE: 56 MMHG | OXYGEN SATURATION: 99 % | WEIGHT: 120 LBS | TEMPERATURE: 97.8 F | BODY MASS INDEX: 20.49 KG/M2 | HEART RATE: 63 BPM | RESPIRATION RATE: 18 BRPM | SYSTOLIC BLOOD PRESSURE: 88 MMHG | HEIGHT: 64 IN

## 2020-12-12 LAB
ALBUMIN SERPL-MCNC: 2.9 G/DL (ref 3.5–5)
ALBUMIN/GLOB SERPL: 1 {RATIO} (ref 1.1–2.2)
ALP SERPL-CCNC: 128 U/L (ref 45–117)
ALT SERPL-CCNC: 89 U/L (ref 12–78)
ANION GAP SERPL CALC-SCNC: 5 MMOL/L (ref 5–15)
AST SERPL W P-5'-P-CCNC: 115 U/L (ref 15–37)
BILIRUB SERPL-MCNC: 1 MG/DL (ref 0.2–1)
BUN SERPL-MCNC: 8 MG/DL (ref 6–20)
BUN/CREAT SERPL: 13 (ref 12–20)
CA-I BLD-MCNC: 8.5 MG/DL (ref 8.5–10.1)
CHLORIDE SERPL-SCNC: 102 MMOL/L (ref 97–108)
CO2 SERPL-SCNC: 30 MMOL/L (ref 21–32)
CREAT SERPL-MCNC: 0.61 MG/DL (ref 0.55–1.02)
ERYTHROCYTE [DISTWIDTH] IN BLOOD BY AUTOMATED COUNT: 12.5 % (ref 11.5–14.5)
GLOBULIN SER CALC-MCNC: 2.9 G/DL (ref 2–4)
GLUCOSE SERPL-MCNC: 86 MG/DL (ref 65–100)
HCT VFR BLD AUTO: 35.8 % (ref 35–47)
HGB BLD-MCNC: 11.6 G/DL (ref 11.5–16)
MCH RBC QN AUTO: 33 PG (ref 26–34)
MCHC RBC AUTO-ENTMCNC: 32.4 G/DL (ref 30–36.5)
MCV RBC AUTO: 101.7 FL (ref 80–99)
PLATELET # BLD AUTO: 391 K/UL (ref 150–400)
PMV BLD AUTO: 9.9 FL (ref 8.9–12.9)
POTASSIUM SERPL-SCNC: 4.5 MMOL/L (ref 3.5–5.1)
PROT SERPL-MCNC: 5.8 G/DL (ref 6.4–8.2)
RBC # BLD AUTO: 3.52 M/UL (ref 3.8–5.2)
SODIUM SERPL-SCNC: 137 MMOL/L (ref 136–145)
WBC # BLD AUTO: 5.3 K/UL (ref 3.6–11)

## 2020-12-12 PROCEDURE — 80053 COMPREHEN METABOLIC PANEL: CPT

## 2020-12-12 PROCEDURE — 36415 COLL VENOUS BLD VENIPUNCTURE: CPT

## 2020-12-12 PROCEDURE — 74011250636 HC RX REV CODE- 250/636: Performed by: FAMILY MEDICINE

## 2020-12-12 PROCEDURE — 85027 COMPLETE CBC AUTOMATED: CPT

## 2020-12-12 PROCEDURE — 74011250637 HC RX REV CODE- 250/637: Performed by: EMERGENCY MEDICINE

## 2020-12-12 PROCEDURE — 74011000250 HC RX REV CODE- 250: Performed by: HOSPITALIST

## 2020-12-12 PROCEDURE — 74011250636 HC RX REV CODE- 250/636: Performed by: HOSPITALIST

## 2020-12-12 PROCEDURE — 74011250637 HC RX REV CODE- 250/637: Performed by: HOSPITALIST

## 2020-12-12 PROCEDURE — 96376 TX/PRO/DX INJ SAME DRUG ADON: CPT

## 2020-12-12 PROCEDURE — 99218 HC RM OBSERVATION: CPT

## 2020-12-12 RX ORDER — PROMETHAZINE HYDROCHLORIDE 12.5 MG/1
12.5 TABLET ORAL
Qty: 30 TAB | Refills: 0 | Status: SHIPPED | OUTPATIENT
Start: 2020-12-12

## 2020-12-12 RX ORDER — FAMOTIDINE 20 MG/1
20 TABLET, FILM COATED ORAL 2 TIMES DAILY
Qty: 30 TAB | Refills: 0 | Status: SHIPPED | OUTPATIENT
Start: 2020-12-12

## 2020-12-12 RX ORDER — DIAZEPAM 5 MG/1
5 TABLET ORAL
Qty: 15 TAB | Refills: 0 | Status: SHIPPED | OUTPATIENT
Start: 2020-12-12

## 2020-12-12 RX ADMIN — MORPHINE SULFATE 1 MG: 2 INJECTION, SOLUTION INTRAMUSCULAR; INTRAVENOUS at 01:14

## 2020-12-12 RX ADMIN — MORPHINE SULFATE 1 MG: 2 INJECTION, SOLUTION INTRAMUSCULAR; INTRAVENOUS at 05:58

## 2020-12-12 RX ADMIN — LORAZEPAM 2 MG: 2 INJECTION INTRAMUSCULAR; INTRAVENOUS at 01:47

## 2020-12-12 RX ADMIN — POTASSIUM CHLORIDE 60 MEQ: 1.5 FOR SOLUTION ORAL at 09:15

## 2020-12-12 RX ADMIN — MORPHINE SULFATE 1 MG: 2 INJECTION, SOLUTION INTRAMUSCULAR; INTRAVENOUS at 10:19

## 2020-12-12 RX ADMIN — FAMOTIDINE 20 MG: 20 TABLET ORAL at 09:15

## 2020-12-12 RX ADMIN — SODIUM CHLORIDE: 9 INJECTION, SOLUTION INTRAVENOUS at 11:03

## 2020-12-12 RX ADMIN — DIAZEPAM 5 MG: 5 TABLET ORAL at 01:14

## 2020-12-12 RX ADMIN — SODIUM CHLORIDE 500 ML: 9 INJECTION, SOLUTION INTRAVENOUS at 09:15

## 2020-12-12 NOTE — PROGRESS NOTES
Discharge note:    Patient being discharged home to self. Vitals are stable and no acute distress noted. IV removed with catheter tip intact. Discharge instructions and education provided to patient. Patient drove to the hospital and stated she feels as if she can drive herself home as well. Discharge plan of care/case management plan validated with provider discharge order.

## 2022-03-19 PROBLEM — E87.6 HYPOKALEMIA: Status: ACTIVE | Noted: 2020-12-11

## 2022-03-19 PROBLEM — F10.10 ALCOHOL ABUSE: Status: ACTIVE | Noted: 2020-12-11

## 2022-03-19 PROBLEM — K29.70 GASTRITIS: Status: ACTIVE | Noted: 2020-12-11

## 2023-05-20 RX ORDER — FAMOTIDINE 20 MG/1
20 TABLET, FILM COATED ORAL 2 TIMES DAILY
COMMUNITY
Start: 2020-12-12

## 2023-05-20 RX ORDER — DIAZEPAM 5 MG/1
5 TABLET ORAL EVERY 8 HOURS PRN
COMMUNITY
Start: 2020-12-12

## 2023-05-20 RX ORDER — PROMETHAZINE HYDROCHLORIDE 12.5 MG/1
12.5 TABLET ORAL EVERY 6 HOURS PRN
COMMUNITY
Start: 2020-12-12

## 2023-05-22 NOTE — DISCHARGE SUMMARY
Call patient to notify normal results PSA is normal HOSPITALIST DISCHARGE SUMMARY    NAME: Isaac Bradshaw   :  1985   MRN:  [id number]     Date/Time:  2020 2:41 PM    DISCHARGE DIAGNOSIS:  Active Problems:    Hypokalemia (2020)      Alcohol abuse (2020)      Gastritis (2020)          CONSULTATIONS: None    Procedures: see electronic medical records for all procedures/Xrays and details which were not copied into this note but were reviewed prior to creation of Plan. Please follow-up tests/labs that are still pendin. None     DISCHARGE SUMMARY/HOSPITAL COURSE: for full details see H&P, daily progress notes, labs, consult notes. Briefly As Per HPI:  Isaac Bradshaw is a 30 y.o. female who has significant history of regular alcohol abuse came to the ER with a complaint of nausea vomiting since last 2 or 3 days. Patient continues to drink during this time as well. She states that she was unable to keep anything down so came to ER. On evaluation in ER patient was found to be dehydrated and hypokalemic. Hospitalist service was requested to admit the patient for further work-up and management. Patients electrolytes are improved. She was educated to quit drinking. She was given community resources for help with alcohol addiction. Patient is tolerating PO diet  _______________________________________________________________________   Patient seen and examined by me on day of discharge. Pertinent findings are:  Vitals:  Visit Vitals  BP (!) 88/56 (BP 1 Location: Left arm)   Pulse 63   Temp 97.8 °F (36.6 °C)   Resp 18   Ht 5' 4\" (1.626 m)   Wt 54.4 kg (120 lb)   SpO2 99%   Breastfeeding No   BMI 20.60 kg/m²     Temp (24hrs), Av °F (36.7 °C), Min:97.8 °F (36.6 °C), Max:98.3 °F (36.8 °C)      Last 24hr Input/Output:  No intake or output data in the 24 hours ending 20 1441     PHYSICAL EXAM:   General: Alert and awake  Skin: Extremities and face reveal no rashes. No blanco erythema.  No telangiectasias on the chest wall. HEENT: Sclerae anicteric. Extra-occular muscles are intact. No oral ulcers. No ENT discharge. The neck is supple. Cardiovascular: Regular rate and rhythm. No murmurs, gallops, or rubs. PMI nondisplaced. Carotids without bruits. Respiratory: Comfortable breathing with no accessory muscle use. Clear breath sounds with no wheezes, rales, or rhonchi. GI: Abdomen nondistended, soft, and nontender. Normal active bowel sounds. No enlargement of the liver or spleen. No masses palpable. Rectal: Deferred   Musculoskeletal: No pitting edema of the lower legs. Extremities have good range of motion. No costovertebral tenderness. Neurological: Gross memory appears intact. Patient is alert and oriented. Power 5/5  Psychiatric: Mood appears appropriate with judgement intact. Lymphatic: No cervical or supraclavicular adenopathy. See Discharge Instructions for further details. _______________________________________________________________________  DISPOSITION:    Home with Family: x   Home with HH/PT/OT/RN:    SNF/LTC:    SKYLA:    OTHER:    ________________________________________________________________________  Medications Reviewed:  Current Discharge Medication List      START taking these medications    Details   famotidine (PEPCID) 20 mg tablet Take 1 Tab by mouth two (2) times a day. Qty: 30 Tab, Refills: 0      promethazine (PHENERGAN) 12.5 mg tablet Take 1 Tab by mouth every six (6) hours as needed for Nausea. Qty: 30 Tab, Refills: 0      diazePAM (VALIUM) 5 mg tablet Take 1 Tab by mouth every eight (8) hours as needed for Anxiety. Max Daily Amount: 15 mg.  Qty: 15 Tab, Refills: 0    Associated Diagnoses: Alcohol abuse            PMH/ reviewed - no change compared to H&P  ________________________________________________________________________    Recommended diet:  Regular Diet  Recommended activity:Activity as tolerated       Follow Up:   Follow-up Information     Follow up With Specialties Details Why Contact Info    Fransisca Bernstein MD Internal Medicine In 1 week  [address]  [phone]             ______________________________________________________________________  Total time spent in discharge (min): 35 min   ________________________________________________________________________    Care Plan discussed with:    Comments   Patient x    Family      RN x    Care Manager x                   Consultant:      ________________________________________________________________________  Attending Physician:  Marta Francis MD  ________________________________________________________________________  **Lab Data Reviewed:  Recent Results (from the past 24 hour(s))   MAGNESIUM    Collection Time: 12/11/20  7:39 PM   Result Value Ref Range    Magnesium 2.1 1.6 - 2.4 mg/dL   METABOLIC PANEL, BASIC    Collection Time: 12/11/20  7:39 PM   Result Value Ref Range    Sodium 135 (L) 136 - 145 mmol/L    Potassium 4.4 3.5 - 5.1 mmol/L    Chloride 96 (L) 97 - 108 mmol/L    CO2 35 (H) 21 - 32 mmol/L    Anion gap 4 (L) 5 - 15 mmol/L    Glucose 98 65 - 100 mg/dL    BUN 6 6 - 20 mg/dL    Creatinine 0.82 0.55 - 1.02 mg/dL    BUN/Creatinine ratio 7 (L) 12 - 20      GFR est AA >60 >60 ml/min/1.73m2    GFR est non-AA >60 >60 ml/min/1.73m2    Calcium 9.1 8.5 - 10.1 mg/dL   CBC W/O DIFF    Collection Time: 12/12/20  7:12 AM   Result Value Ref Range    WBC 5.3 3.6 - 11.0 K/uL    RBC 3.52 (L) 3.80 - 5.20 M/uL    HGB 11.6 11.5 - 16.0 g/dL    HCT 35.8 35.0 - 47.0 %    .7 (H) 80.0 - 99.0 FL    MCH 33.0 26.0 - 34.0 PG    MCHC 32.4 30.0 - 36.5 g/dL    RDW 12.5 11.5 - 14.5 %    PLATELET 982 140 - 323 K/uL    MPV 9.9 8.9 - 63.1 FL   METABOLIC PANEL, COMPREHENSIVE    Collection Time: 12/12/20  7:12 AM   Result Value Ref Range    Sodium 137 136 - 145 mmol/L    Potassium 4.5 3.5 - 5.1 mmol/L    Chloride 102 97 - 108 mmol/L    CO2 30 21 - 32 mmol/L    Anion gap 5 5 - 15 mmol/L Glucose 86 65 - 100 mg/dL    BUN 8 6 - 20 mg/dL    Creatinine 0.61 0.55 - 1.02 mg/dL    BUN/Creatinine ratio 13 12 - 20      GFR est AA >60 >60 ml/min/1.73m2    GFR est non-AA >60 >60 ml/min/1.73m2    Calcium 8.5 8.5 - 10.1 mg/dL    Bilirubin, total 1.0 0.2 - 1.0 mg/dL    AST (SGOT) 115 (H) 15 - 37 U/L    ALT (SGPT) 89 (H) 12 - 78 U/L    Alk. phosphatase 128 (H) 45 - 117 U/L    Protein, total 5.8 (L) 6.4 - 8.2 g/dL    Albumin 2.9 (L) 3.5 - 5.0 g/dL    Globulin 2.9 2.0 - 4.0 g/dL    A-G Ratio 1.0 (L) 1.1 - 2.2       CT ABD PELV W CONT   Final Result   IMPRESSION: No bowel obstruction. No CT evidence for appendicitis or   diverticulitis. Prior cholecystectomy.    2.5-3 cm right ovarian cyst.